# Patient Record
(demographics unavailable — no encounter records)

---

## 2024-12-09 NOTE — HISTORY OF PRESENT ILLNESS
[FreeTextEntry1] : Pt is 43 year old female with hx of htn, type II diabetes, elevated cholesterol.  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 after coupl.e of blocks gets sob and slows down cp is random. no hx of heart valve issues. never had echo or stress snores at night never tested for DARIUS

## 2024-12-09 NOTE — DISCUSSION/SUMMARY
[Possible Cardiac Ischemia (Intermd Prob)] : possible cardiac ischemia (intermediate probability) [Stress Echocardiogram] : stress echocardiogram [Hyperlipidemia] : hyperlipidemia [Hypertriglyceridemia] : essential hypertriglyceridemia [Medication Changes Per Orders] : Medication changes are as documented in orders [Diet Modification] : diet modification [Exercise] : exercise [Weight Loss] : weight loss [Essential Hypertension] : essential hypertension [Responding to Treatment] : responding to treatment [None] : There are no changes in medication management [de-identified] : change statin and d/c gemfibrozil [FreeTextEntry1] : 43 year old with mult risk factors for CAD: dM, htn, elevated cholesterol with atyp cp and episodes of sob Bp is controlled hgba1c 7.5%  needs better control  Lipids  tcho 193, , HDL 45, Trg 196 increase atorva to 40 mg dc gemfib stress echo to eval sx sleep study. [EKG obtained to assist in diagnosis and management of assessed problem(s)] : EKG obtained to assist in diagnosis and management of assessed problem(s)

## 2024-12-09 NOTE — REVIEW OF SYSTEMS
[SOB] : shortness of breath [Dyspnea on exertion] : dyspnea during exertion Yes [Chest Discomfort] : chest discomfort [Negative] : Heme/Lymph

## 2025-01-03 NOTE — HISTORY OF PRESENT ILLNESS
[FreeTextEntry1] : Pt is 43 year old female with hx of htn, type II diabetes, elevated cholesterol.  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 after coupl.e of blocks gets sob and slows down cp is random. no hx of heart valve issues. never had echo or stress snores at night never tested for DARIUS  1/3/2025  completed stress echo.  no evidence of ischemia..  BP response was normal but heart rate response was exaggerated but without ischemia.  insurance would not cover home sleep study.

## 2025-01-03 NOTE — DISCUSSION/SUMMARY
[Hyperlipidemia] : hyperlipidemia [Essential Hypertension] : essential hypertension [Stable] : stable [FreeTextEntry1] : 43 year old with mult risk factors for CAD: dM, htn, elevated cholesterol with atyp cp and episodes of sob Bp is controlled hgba1c 7.5%  needs better control  Lipids  tcho 193, , HDL 45, Trg 196 increase atorva to 40 mg dc gemfib stress echo to eval sx sleep study.  1/3/2025 stress echo neg for ischemia.  BP is controlled.  blood work to follow up cholesterol.  insurance would not approve sleep study...blood work today.,. [EKG obtained to assist in diagnosis and management of assessed problem(s)] : EKG obtained to assist in diagnosis and management of assessed problem(s)